# Patient Record
Sex: MALE | Race: OTHER | HISPANIC OR LATINO | URBAN - METROPOLITAN AREA
[De-identification: names, ages, dates, MRNs, and addresses within clinical notes are randomized per-mention and may not be internally consistent; named-entity substitution may affect disease eponyms.]

---

## 2019-11-19 ENCOUNTER — EMERGENCY (EMERGENCY)
Facility: HOSPITAL | Age: 34
LOS: 1 days | Discharge: HOME | End: 2019-11-19
Attending: EMERGENCY MEDICINE
Payer: COMMERCIAL

## 2019-11-19 VITALS
OXYGEN SATURATION: 99 % | RESPIRATION RATE: 18 BRPM | HEART RATE: 64 BPM | SYSTOLIC BLOOD PRESSURE: 112 MMHG | DIASTOLIC BLOOD PRESSURE: 67 MMHG | TEMPERATURE: 98 F

## 2019-11-19 VITALS
OXYGEN SATURATION: 99 % | SYSTOLIC BLOOD PRESSURE: 166 MMHG | WEIGHT: 160.06 LBS | RESPIRATION RATE: 18 BRPM | TEMPERATURE: 98 F | DIASTOLIC BLOOD PRESSURE: 89 MMHG | HEART RATE: 60 BPM | HEIGHT: 65 IN

## 2019-11-19 LAB
ALBUMIN SERPL ELPH-MCNC: 3.8 G/DL — SIGNIFICANT CHANGE UP (ref 3.5–5)
ALP SERPL-CCNC: 74 U/L — SIGNIFICANT CHANGE UP (ref 40–120)
ALT FLD-CCNC: 33 U/L DA — SIGNIFICANT CHANGE UP (ref 10–60)
ANION GAP SERPL CALC-SCNC: 8 MMOL/L — SIGNIFICANT CHANGE UP (ref 5–17)
APPEARANCE UR: CLEAR — SIGNIFICANT CHANGE UP
AST SERPL-CCNC: 23 U/L — SIGNIFICANT CHANGE UP (ref 10–40)
BASOPHILS # BLD AUTO: 0.05 K/UL — SIGNIFICANT CHANGE UP (ref 0–0.2)
BASOPHILS NFR BLD AUTO: 0.3 % — SIGNIFICANT CHANGE UP (ref 0–2)
BILIRUB SERPL-MCNC: 0.9 MG/DL — SIGNIFICANT CHANGE UP (ref 0.2–1.2)
BILIRUB UR-MCNC: NEGATIVE — SIGNIFICANT CHANGE UP
BUN SERPL-MCNC: 15 MG/DL — SIGNIFICANT CHANGE UP (ref 7–18)
CALCIUM SERPL-MCNC: 8.7 MG/DL — SIGNIFICANT CHANGE UP (ref 8.4–10.5)
CHLORIDE SERPL-SCNC: 106 MMOL/L — SIGNIFICANT CHANGE UP (ref 96–108)
CO2 SERPL-SCNC: 25 MMOL/L — SIGNIFICANT CHANGE UP (ref 22–31)
COLOR SPEC: YELLOW — SIGNIFICANT CHANGE UP
CREAT SERPL-MCNC: 1.27 MG/DL — SIGNIFICANT CHANGE UP (ref 0.5–1.3)
DIFF PNL FLD: NEGATIVE — SIGNIFICANT CHANGE UP
EOSINOPHIL # BLD AUTO: 0.05 K/UL — SIGNIFICANT CHANGE UP (ref 0–0.5)
EOSINOPHIL NFR BLD AUTO: 0.3 % — SIGNIFICANT CHANGE UP (ref 0–6)
GLUCOSE SERPL-MCNC: 104 MG/DL — HIGH (ref 70–99)
GLUCOSE UR QL: NEGATIVE — SIGNIFICANT CHANGE UP
HCT VFR BLD CALC: 41.3 % — SIGNIFICANT CHANGE UP (ref 39–50)
HGB BLD-MCNC: 14.4 G/DL — SIGNIFICANT CHANGE UP (ref 13–17)
IMM GRANULOCYTES NFR BLD AUTO: 0.4 % — SIGNIFICANT CHANGE UP (ref 0–1.5)
KETONES UR-MCNC: NEGATIVE — SIGNIFICANT CHANGE UP
LEUKOCYTE ESTERASE UR-ACNC: NEGATIVE — SIGNIFICANT CHANGE UP
LIDOCAIN IGE QN: 88 U/L — SIGNIFICANT CHANGE UP (ref 73–393)
LYMPHOCYTES # BLD AUTO: 16.3 % — SIGNIFICANT CHANGE UP (ref 13–44)
LYMPHOCYTES # BLD AUTO: 2.65 K/UL — SIGNIFICANT CHANGE UP (ref 1–3.3)
MCHC RBC-ENTMCNC: 32.3 PG — SIGNIFICANT CHANGE UP (ref 27–34)
MCHC RBC-ENTMCNC: 34.9 GM/DL — SIGNIFICANT CHANGE UP (ref 32–36)
MCV RBC AUTO: 92.6 FL — SIGNIFICANT CHANGE UP (ref 80–100)
MONOCYTES # BLD AUTO: 1.25 K/UL — HIGH (ref 0–0.9)
MONOCYTES NFR BLD AUTO: 7.7 % — SIGNIFICANT CHANGE UP (ref 2–14)
NEUTROPHILS # BLD AUTO: 12.24 K/UL — HIGH (ref 1.8–7.4)
NEUTROPHILS NFR BLD AUTO: 75 % — SIGNIFICANT CHANGE UP (ref 43–77)
NITRITE UR-MCNC: NEGATIVE — SIGNIFICANT CHANGE UP
NRBC # BLD: 0 /100 WBCS — SIGNIFICANT CHANGE UP (ref 0–0)
PH UR: 8 — SIGNIFICANT CHANGE UP (ref 5–8)
PLATELET # BLD AUTO: 311 K/UL — SIGNIFICANT CHANGE UP (ref 150–400)
POTASSIUM SERPL-MCNC: 3.8 MMOL/L — SIGNIFICANT CHANGE UP (ref 3.5–5.3)
POTASSIUM SERPL-SCNC: 3.8 MMOL/L — SIGNIFICANT CHANGE UP (ref 3.5–5.3)
PROT SERPL-MCNC: 7.2 G/DL — SIGNIFICANT CHANGE UP (ref 6–8.3)
PROT UR-MCNC: NEGATIVE — SIGNIFICANT CHANGE UP
RBC # BLD: 4.46 M/UL — SIGNIFICANT CHANGE UP (ref 4.2–5.8)
RBC # FLD: 11.8 % — SIGNIFICANT CHANGE UP (ref 10.3–14.5)
SODIUM SERPL-SCNC: 139 MMOL/L — SIGNIFICANT CHANGE UP (ref 135–145)
SP GR SPEC: 1.01 — SIGNIFICANT CHANGE UP (ref 1.01–1.02)
UROBILINOGEN FLD QL: NEGATIVE — SIGNIFICANT CHANGE UP
WBC # BLD: 16.3 K/UL — HIGH (ref 3.8–10.5)
WBC # FLD AUTO: 16.3 K/UL — HIGH (ref 3.8–10.5)

## 2019-11-19 PROCEDURE — 99284 EMERGENCY DEPT VISIT MOD MDM: CPT

## 2019-11-19 PROCEDURE — 96374 THER/PROPH/DIAG INJ IV PUSH: CPT

## 2019-11-19 PROCEDURE — 85027 COMPLETE CBC AUTOMATED: CPT

## 2019-11-19 PROCEDURE — 87086 URINE CULTURE/COLONY COUNT: CPT

## 2019-11-19 PROCEDURE — 80053 COMPREHEN METABOLIC PANEL: CPT

## 2019-11-19 PROCEDURE — 74176 CT ABD & PELVIS W/O CONTRAST: CPT

## 2019-11-19 PROCEDURE — 36415 COLL VENOUS BLD VENIPUNCTURE: CPT

## 2019-11-19 PROCEDURE — 96375 TX/PRO/DX INJ NEW DRUG ADDON: CPT

## 2019-11-19 PROCEDURE — 74176 CT ABD & PELVIS W/O CONTRAST: CPT | Mod: 26

## 2019-11-19 PROCEDURE — 99284 EMERGENCY DEPT VISIT MOD MDM: CPT | Mod: 25

## 2019-11-19 PROCEDURE — 83690 ASSAY OF LIPASE: CPT

## 2019-11-19 PROCEDURE — 81003 URINALYSIS AUTO W/O SCOPE: CPT

## 2019-11-19 RX ORDER — IBUPROFEN 200 MG
1 TABLET ORAL
Qty: 40 | Refills: 0
Start: 2019-11-19 | End: 2019-11-28

## 2019-11-19 RX ORDER — MORPHINE SULFATE 50 MG/1
4 CAPSULE, EXTENDED RELEASE ORAL ONCE
Refills: 0 | Status: DISCONTINUED | OUTPATIENT
Start: 2019-11-19 | End: 2019-11-19

## 2019-11-19 RX ORDER — OXYCODONE AND ACETAMINOPHEN 5; 325 MG/1; MG/1
1 TABLET ORAL ONCE
Refills: 0 | Status: DISCONTINUED | OUTPATIENT
Start: 2019-11-19 | End: 2019-11-19

## 2019-11-19 RX ORDER — SODIUM CHLORIDE 9 MG/ML
1000 INJECTION INTRAMUSCULAR; INTRAVENOUS; SUBCUTANEOUS ONCE
Refills: 0 | Status: COMPLETED | OUTPATIENT
Start: 2019-11-19 | End: 2019-11-19

## 2019-11-19 RX ORDER — KETOROLAC TROMETHAMINE 30 MG/ML
30 SYRINGE (ML) INJECTION ONCE
Refills: 0 | Status: DISCONTINUED | OUTPATIENT
Start: 2019-11-19 | End: 2019-11-19

## 2019-11-19 RX ORDER — TAMSULOSIN HYDROCHLORIDE 0.4 MG/1
0.4 CAPSULE ORAL ONCE
Refills: 0 | Status: COMPLETED | OUTPATIENT
Start: 2019-11-19 | End: 2019-11-19

## 2019-11-19 RX ORDER — TAMSULOSIN HYDROCHLORIDE 0.4 MG/1
1 CAPSULE ORAL
Qty: 10 | Refills: 0
Start: 2019-11-19 | End: 2019-11-28

## 2019-11-19 RX ADMIN — SODIUM CHLORIDE 1000 MILLILITER(S): 9 INJECTION INTRAMUSCULAR; INTRAVENOUS; SUBCUTANEOUS at 04:23

## 2019-11-19 RX ADMIN — Medication 30 MILLIGRAM(S): at 04:21

## 2019-11-19 RX ADMIN — TAMSULOSIN HYDROCHLORIDE 0.4 MILLIGRAM(S): 0.4 CAPSULE ORAL at 07:57

## 2019-11-19 RX ADMIN — SODIUM CHLORIDE 1000 MILLILITER(S): 9 INJECTION INTRAMUSCULAR; INTRAVENOUS; SUBCUTANEOUS at 04:21

## 2019-11-19 RX ADMIN — MORPHINE SULFATE 4 MILLIGRAM(S): 50 CAPSULE, EXTENDED RELEASE ORAL at 04:23

## 2019-11-19 RX ADMIN — MORPHINE SULFATE 4 MILLIGRAM(S): 50 CAPSULE, EXTENDED RELEASE ORAL at 04:21

## 2019-11-19 RX ADMIN — Medication 30 MILLIGRAM(S): at 04:23

## 2019-11-19 RX ADMIN — OXYCODONE AND ACETAMINOPHEN 1 TABLET(S): 5; 325 TABLET ORAL at 07:57

## 2019-11-19 NOTE — ED PROVIDER NOTE - NSFOLLOWUPINSTRUCTIONS_ED_ALL_ED_FT
take medications as prescribed.  Followup with urologist above for reevaluation    Return to ED if you develop fever>100.8F, worsening pain despite medication or vomiting.

## 2019-11-19 NOTE — ED PROVIDER NOTE - OBJECTIVE STATEMENT
Pt is a 34y M with significant PMHx of kidney stones and no significant PSHx presenting to the ED with 2 days worsening Rt flank pain associated with nausea. Pt reports dark urine yesterday that has since cleared up and mild dysuria. Pt has a history of Rt sided kidney stones and notes that his current pain radiates from the Rt flank down to groin. Pt denies any vomiting, no fever. Pt has NKDA and currently denies any additional complaints at this time.

## 2019-11-19 NOTE — ED PROVIDER NOTE - CLINICAL SUMMARY MEDICAL DECISION MAKING FREE TEXT BOX
Pt is a 34y M with pmhx of kidney stones presenting to the ED with Rt flank pain. Will obtain labs, UA and CT abdomen. Given Morphine and Toradol for pain along with IV fluids. Will reassess. Pt is a 34y M with pmhx of kidney stones presenting to the ED with Rt flank pain. Will obtain labs, UA and CT abdomen. Given Morphine and Toradol for pain along with IV fluids. Will reassess.    labs show wbc 16K, Cr wnl, UA neg  CT shows 5mm stone in proximal right ureter with mild hydronephrosis  Discussed above with patient. On reeval patient comfortable reporting only mild R flank pain at this time, given percocet/flomax. Patient stable for discharge to followup with urologist as outpatient. Pt is a 34y M with pmhx of kidney stones presenting to the ED with Rt flank pain. Will obtain labs, UA and CT abdomen. Given Morphine and Toradol for pain along with IV fluids. Will reassess.    labs show wbc 16K, Cr wnl, UA neg  CT shows Mild right-sided hydroureteronephrosis and asymmetric perinephric stranding secondary to a 5 mm calculus in the proximal right ureter.  Discussed above with patient. On reeval patient comfortable reporting only mild R flank pain at this time, given percocet/flomax. Patient stable for discharge to followup with urologist as outpatient. given careful return to ED precautions.

## 2019-11-19 NOTE — ED PROVIDER NOTE - PATIENT PORTAL LINK FT
You can access the FollowMyHealth Patient Portal offered by [facility] by registering at the following website: [email or web address]. By joining [employer]’s FollowMyHealth portal, you will also be able to view your health information using other applications (apps) compatible with our system.

## 2019-11-20 LAB
CULTURE RESULTS: SIGNIFICANT CHANGE UP
SPECIMEN SOURCE: SIGNIFICANT CHANGE UP

## 2019-11-21 ENCOUNTER — APPOINTMENT (OUTPATIENT)
Dept: UROLOGY | Facility: CLINIC | Age: 34
End: 2019-11-21
Payer: COMMERCIAL

## 2019-11-21 VITALS
HEART RATE: 73 BPM | SYSTOLIC BLOOD PRESSURE: 149 MMHG | BODY MASS INDEX: 26.66 KG/M2 | HEIGHT: 65 IN | WEIGHT: 160 LBS | DIASTOLIC BLOOD PRESSURE: 89 MMHG

## 2019-11-21 DIAGNOSIS — Z78.9 OTHER SPECIFIED HEALTH STATUS: ICD-10-CM

## 2019-11-21 DIAGNOSIS — Z84.1 FAMILY HISTORY OF DISORDERS OF KIDNEY AND URETER: ICD-10-CM

## 2019-11-21 PROBLEM — Z00.00 ENCOUNTER FOR PREVENTIVE HEALTH EXAMINATION: Status: ACTIVE | Noted: 2019-11-21

## 2019-11-21 PROCEDURE — 99204 OFFICE O/P NEW MOD 45 MIN: CPT

## 2019-11-21 RX ORDER — TAMSULOSIN HYDROCHLORIDE 0.4 MG/1
0.4 CAPSULE ORAL
Refills: 0 | Status: ACTIVE | COMMUNITY

## 2019-11-21 RX ORDER — OXYCODONE HYDROCHLORIDE AND ACETAMINOPHEN 5; 325 MG/1; MG/1
5-325 TABLET ORAL
Refills: 0 | Status: ACTIVE | COMMUNITY

## 2019-11-21 RX ORDER — IBUPROFEN 600 MG/1
600 TABLET, FILM COATED ORAL
Refills: 0 | Status: ACTIVE | COMMUNITY

## 2019-11-21 NOTE — ASSESSMENT
[FreeTextEntry1] : kub reviewed stone still in prox ureter\par reviewed options cont medical expulsive therapy, eswl and urs\par risks and benefits reviewed\par pt elect eswl \par risk of bleeding infection damage to kidney failure of procedure need for further procedure reviewed\par advised no aspirin ibuprofen blood thinning products for 1 week prior\par advised pt will be leaving next monday \par pt to be scheduled with dr chatman \par to er for worsening pain/fever

## 2019-11-21 NOTE — HISTORY OF PRESENT ILLNESS
[FreeTextEntry1] : cc kidneystone\par 35 yo male seen in  er monday with left flank pain \par ct showed 5 prox ureteral stone \par still has pain \par no fever \par voiding well\par has prior stone epsiode

## 2019-11-21 NOTE — REVIEW OF SYSTEMS
[Abdominal Pain] : abdominal pain [Vomiting] : vomiting [see HPI] : see HPI [History of kidney stones] : history of kidney stones [Negative] : Heme/Lymph

## 2019-11-25 ENCOUNTER — MEDICATION RENEWAL (OUTPATIENT)
Age: 34
End: 2019-11-25

## 2019-11-25 ENCOUNTER — FORM ENCOUNTER (OUTPATIENT)
Age: 34
End: 2019-11-25

## 2019-11-25 ENCOUNTER — OUTPATIENT (OUTPATIENT)
Dept: OUTPATIENT SERVICES | Facility: HOSPITAL | Age: 34
LOS: 1 days | End: 2019-11-25
Payer: COMMERCIAL

## 2019-11-25 VITALS
OXYGEN SATURATION: 98 % | TEMPERATURE: 98 F | HEIGHT: 65 IN | SYSTOLIC BLOOD PRESSURE: 150 MMHG | HEART RATE: 60 BPM | WEIGHT: 160.94 LBS | RESPIRATION RATE: 16 BRPM | DIASTOLIC BLOOD PRESSURE: 90 MMHG

## 2019-11-25 DIAGNOSIS — R10.9 UNSPECIFIED ABDOMINAL PAIN: ICD-10-CM

## 2019-11-25 DIAGNOSIS — N39.0 URINARY TRACT INFECTION, SITE NOT SPECIFIED: ICD-10-CM

## 2019-11-25 DIAGNOSIS — N20.1 CALCULUS OF URETER: ICD-10-CM

## 2019-11-25 DIAGNOSIS — Z01.818 ENCOUNTER FOR OTHER PREPROCEDURAL EXAMINATION: ICD-10-CM

## 2019-11-25 PROCEDURE — G0463: CPT

## 2019-11-25 RX ORDER — SODIUM CHLORIDE 9 MG/ML
3 INJECTION INTRAMUSCULAR; INTRAVENOUS; SUBCUTANEOUS EVERY 8 HOURS
Refills: 0 | Status: DISCONTINUED | OUTPATIENT
Start: 2019-11-26 | End: 2020-01-11

## 2019-11-25 RX ORDER — AMOXICILLIN AND CLAVULANATE POTASSIUM 875; 125 MG/1; MG/1
875-125 TABLET, COATED ORAL
Qty: 10 | Refills: 0 | Status: COMPLETED | COMMUNITY
Start: 2019-11-25 | End: 2019-11-30

## 2019-11-25 NOTE — H&P PST ADULT - NEGATIVE BREAST SYMPTOMS
no breast lump L/no nipple discharge L/no breast tenderness L/no breast lump R/no nipple discharge R/no breast tenderness R

## 2019-11-25 NOTE — H&P PST ADULT - NSICDXPASTMEDICALHX_GEN_ALL_CORE_FT
PAST MEDICAL HISTORY:  Calculus of right ureter     Kidney stone right side-2017    UTI (urinary tract infection), bacterial pt on Augmentin

## 2019-11-25 NOTE — H&P PST ADULT - NSANTHNECKRD_ENT_A_CORE
Patient was advised of message below. Verbally understands.    Stated that he hasn't taken the wellbutrin in an week. Requesting something else to be sent to pharmacy for him to stop smoking. Did mention about the patch his wife was using.   No

## 2019-11-25 NOTE — H&P PST ADULT - NSANTHOSAYNRD_GEN_A_CORE
No. [place] screening performed.  STOP BANG Legend: 0-2 = LOW Risk; 3-4 = INTERMEDIATE Risk; 5-8 = HIGH Risk

## 2019-11-25 NOTE — H&P PST ADULT - NEGATIVE ALLERGY TYPES
no reactions to insect bites/no outdoor environmental allergies/no indoor environmental allergies/no reactions to animals/no reactions to medicines/no reactions to food/shellfish allergy

## 2019-11-25 NOTE — H&P PST ADULT - NEGATIVE NEUROLOGICAL SYMPTOMS
no generalized seizures/no tremors/no loss of sensation/no hemiparesis/no facial palsy/no vertigo/no headache/no confusion/no transient paralysis/no paresthesias/no weakness/no focal seizures/no loss of consciousness/no syncope

## 2019-11-25 NOTE — H&P PST ADULT - NEGATIVE OPHTHALMOLOGIC SYMPTOMS
no pain L/no discharge R/no blurred vision L/no diplopia/no photophobia/no blurred vision R/no lacrimation L/no lacrimation R/no discharge L/no pain R

## 2019-11-25 NOTE — H&P PST ADULT - NSICDXPROBLEM_GEN_ALL_CORE_FT
PROBLEM DIAGNOSES  Problem: Acute right flank pain  Assessment and Plan: Pt is on morphine 15 mg orally , Ibuprofen 600 mg orally and Tylenol 925 mg orally, pt encouraged to continue Tylenol and morpine today, no morphuine in am of sx, no longer Ibuprofen for pain, since BP increased due to acute pain - 8-9/10 , pt is not comfortabble, walking during visit at PST     Problem: Urinary tract infection  Assessment and Plan: patient on Augmentin,started today, called Dr. Vieyra, notified her taht , U/A , utine cultures not sent, , pt just started abx    Problem: Calculus of right ureter  Assessment and Plan: Patient  is scheduled for right extracorporeal shockwave lithotripsy on 11/26/19.

## 2019-11-25 NOTE — H&P PST ADULT - HISTORY OF PRESENT ILLNESS
34 years old male presented to [facility] for evaluation before surgery, pt was diagnosed with calculus of ureter and is scheduled for right extracorporeal shockwave lithotripsy on 11/26/19.

## 2019-11-25 NOTE — H&P PST ADULT - CONSTITUTIONAL COMMENTS
pt is walking, can not find comfortable position, has elevated BP due to pain , seen second tome on ER for pain

## 2019-11-25 NOTE — H&P PST ADULT - NEGATIVE GENERAL GENITOURINARY SYMPTOMS
no dysuria/no incontinence/hx of right kidney stones, currently UTI, currently on abx , presently calculus of right kidney ,/no flank pain L/no urine discoloration/no hematuria

## 2019-11-25 NOTE — H&P PST ADULT - ATTENDING COMMENTS
Decision made to change procedure to right ureteroscopy with holmium laser lithotripsy with ureteral stent. REviewed all risks and benefits and informed consent obtained

## 2019-11-26 ENCOUNTER — APPOINTMENT (OUTPATIENT)
Dept: UROLOGY | Facility: HOSPITAL | Age: 34
End: 2019-11-26

## 2019-11-26 ENCOUNTER — OUTPATIENT (OUTPATIENT)
Dept: OUTPATIENT SERVICES | Facility: HOSPITAL | Age: 34
LOS: 1 days | End: 2019-11-26
Payer: COMMERCIAL

## 2019-11-26 VITALS
HEART RATE: 65 BPM | OXYGEN SATURATION: 98 % | TEMPERATURE: 99 F | SYSTOLIC BLOOD PRESSURE: 133 MMHG | RESPIRATION RATE: 18 BRPM | DIASTOLIC BLOOD PRESSURE: 68 MMHG

## 2019-11-26 VITALS
WEIGHT: 160.94 LBS | RESPIRATION RATE: 18 BRPM | HEART RATE: 61 BPM | HEIGHT: 65 IN | SYSTOLIC BLOOD PRESSURE: 155 MMHG | OXYGEN SATURATION: 99 % | DIASTOLIC BLOOD PRESSURE: 82 MMHG | TEMPERATURE: 98 F

## 2019-11-26 DIAGNOSIS — Z01.818 ENCOUNTER FOR OTHER PREPROCEDURAL EXAMINATION: ICD-10-CM

## 2019-11-26 DIAGNOSIS — N20.1 CALCULUS OF URETER: ICD-10-CM

## 2019-11-26 PROCEDURE — C1726: CPT

## 2019-11-26 PROCEDURE — 76000 FLUOROSCOPY <1 HR PHYS/QHP: CPT

## 2019-11-26 PROCEDURE — 52344 CYSTO/URETERO STRICTURE TX: CPT | Mod: RT

## 2019-11-26 PROCEDURE — C1758: CPT

## 2019-11-26 PROCEDURE — 52332 CYSTOSCOPY AND TREATMENT: CPT | Mod: RT

## 2019-11-26 PROCEDURE — C2617: CPT

## 2019-11-26 PROCEDURE — C1769: CPT

## 2019-11-26 PROCEDURE — C1889: CPT

## 2019-11-26 RX ORDER — OXYCODONE AND ACETAMINOPHEN 5; 325 MG/1; MG/1
1 TABLET ORAL EVERY 6 HOURS
Refills: 0 | Status: DISCONTINUED | OUTPATIENT
Start: 2019-11-26 | End: 2019-11-26

## 2019-11-26 RX ORDER — HYDROMORPHONE HYDROCHLORIDE 2 MG/ML
0.5 INJECTION INTRAMUSCULAR; INTRAVENOUS; SUBCUTANEOUS
Refills: 0 | Status: DISCONTINUED | OUTPATIENT
Start: 2019-11-26 | End: 2019-11-26

## 2019-11-26 RX ORDER — ONDANSETRON 8 MG/1
1 TABLET, FILM COATED ORAL
Qty: 0 | Refills: 0 | DISCHARGE

## 2019-11-26 RX ORDER — OXYCODONE AND ACETAMINOPHEN 5; 325 MG/1; MG/1
2 TABLET ORAL EVERY 6 HOURS
Refills: 0 | Status: DISCONTINUED | OUTPATIENT
Start: 2019-11-26 | End: 2019-11-26

## 2019-11-26 RX ORDER — KETOROLAC TROMETHAMINE 30 MG/ML
15 SYRINGE (ML) INJECTION EVERY 6 HOURS
Refills: 0 | Status: COMPLETED | OUTPATIENT
Start: 2019-11-26 | End: 2019-12-01

## 2019-11-26 RX ORDER — OXYBUTYNIN CHLORIDE 5 MG
1 TABLET ORAL
Qty: 4 | Refills: 0
Start: 2019-11-26 | End: 2019-11-29

## 2019-11-26 RX ORDER — ONDANSETRON 8 MG/1
4 TABLET, FILM COATED ORAL ONCE
Refills: 0 | Status: DISCONTINUED | OUTPATIENT
Start: 2019-11-26 | End: 2019-11-26

## 2019-11-26 RX ORDER — PHENAZOPYRIDINE HCL 100 MG
100 TABLET ORAL ONCE
Refills: 0 | Status: DISCONTINUED | OUTPATIENT
Start: 2019-11-26 | End: 2020-01-11

## 2019-11-26 RX ORDER — AZTREONAM 2 G
1 VIAL (EA) INJECTION
Qty: 6 | Refills: 0
Start: 2019-11-26 | End: 2019-11-28

## 2019-11-26 RX ORDER — PHENAZOPYRIDINE HCL 100 MG
1 TABLET ORAL
Qty: 12 | Refills: 0
Start: 2019-11-26 | End: 2019-11-29

## 2019-11-26 RX ORDER — OXYBUTYNIN CHLORIDE 5 MG
5 TABLET ORAL ONCE
Refills: 0 | Status: DISCONTINUED | OUTPATIENT
Start: 2019-11-26 | End: 2020-01-11

## 2019-11-26 RX ORDER — ACETAMINOPHEN 500 MG
3 TABLET ORAL
Qty: 0 | Refills: 0 | DISCHARGE

## 2019-11-26 RX ORDER — MORPHINE SULFATE 50 MG/1
1 CAPSULE, EXTENDED RELEASE ORAL
Qty: 0 | Refills: 0 | DISCHARGE

## 2019-11-26 RX ORDER — SODIUM CHLORIDE 9 MG/ML
1000 INJECTION, SOLUTION INTRAVENOUS
Refills: 0 | Status: DISCONTINUED | OUTPATIENT
Start: 2019-11-26 | End: 2019-11-26

## 2019-11-26 RX ADMIN — Medication 15 MILLIGRAM(S): at 14:36

## 2019-11-26 RX ADMIN — HYDROMORPHONE HYDROCHLORIDE 0.5 MILLIGRAM(S): 2 INJECTION INTRAMUSCULAR; INTRAVENOUS; SUBCUTANEOUS at 12:19

## 2019-11-26 RX ADMIN — HYDROMORPHONE HYDROCHLORIDE 0.5 MILLIGRAM(S): 2 INJECTION INTRAMUSCULAR; INTRAVENOUS; SUBCUTANEOUS at 12:04

## 2019-11-26 RX ADMIN — HYDROMORPHONE HYDROCHLORIDE 0.5 MILLIGRAM(S): 2 INJECTION INTRAMUSCULAR; INTRAVENOUS; SUBCUTANEOUS at 12:34

## 2019-11-26 RX ADMIN — HYDROMORPHONE HYDROCHLORIDE 0.5 MILLIGRAM(S): 2 INJECTION INTRAMUSCULAR; INTRAVENOUS; SUBCUTANEOUS at 13:30

## 2019-11-26 RX ADMIN — HYDROMORPHONE HYDROCHLORIDE 0.5 MILLIGRAM(S): 2 INJECTION INTRAMUSCULAR; INTRAVENOUS; SUBCUTANEOUS at 11:49

## 2019-11-26 RX ADMIN — HYDROMORPHONE HYDROCHLORIDE 0.5 MILLIGRAM(S): 2 INJECTION INTRAMUSCULAR; INTRAVENOUS; SUBCUTANEOUS at 13:00

## 2019-11-26 NOTE — ASU DISCHARGE PLAN (ADULT/PEDIATRIC) - PROCEDURE
Cystoscopy, right ureteroscopy, retrograde pyelogram, right ureteral balloon dilation, right stent placement

## 2019-11-26 NOTE — ASU DISCHARGE PLAN (ADULT/PEDIATRIC) - ASU DC SPECIAL INSTRUCTIONSFT
Please call to make a follow up apt in 1 week with Dr. Vieyra in the office.  Take flomax prescribed to you to help you urinate.  Take oral antibiotic (Bactrim) for a total of 3 days.  May take over the counter tylenol or motrin for mild pain relief.  May take prescribed percocet for moderate pain relief.

## 2019-11-26 NOTE — BRIEF OPERATIVE NOTE - NSICDXBRIEFPOSTOP_GEN_ALL_CORE_FT
POST-OP DIAGNOSIS:  Right ureteral calculus 26-Nov-2019 10:43:37  Denise Mendez POST-OP DIAGNOSIS:  Ureteral stricture, right 26-Nov-2019 14:54:41  Catherine Vieyra  Right ureteral calculus 26-Nov-2019 10:43:37  Denise Mendez

## 2019-11-26 NOTE — ASU DISCHARGE PLAN (ADULT/PEDIATRIC) - CARE PROVIDER_API CALL
Catherine Vieyra (MD; MPH)  Urology  [address] [place] [address]  [place] [address]  Phone: [phone]  Fax: [phone]  Follow Up Time:

## 2019-11-26 NOTE — ASU DISCHARGE PLAN (ADULT/PEDIATRIC) - CALL YOUR DOCTOR IF YOU HAVE ANY OF THE FOLLOWING:
Unable to urinate/Fever greater than (need to indicate Fahrenheit or Celsius)/Pain not relieved by Medications/Bleeding that does not stop

## 2019-11-26 NOTE — BRIEF OPERATIVE NOTE - NSICDXBRIEFPROCEDURE_GEN_ALL_CORE_FT
PROCEDURES:  Cystourethroscopy with ureteroscopy with ureteral stricture dilation, balloon 26-Nov-2019 10:43:00  Denise Mendez  Cystoscopy with right retrograde pyelography with ureteroscopy with insertion of ureteral stent 26-Nov-2019 10:42:51  Denise Mendez

## 2019-11-26 NOTE — BRIEF OPERATIVE NOTE - OPERATION/FINDINGS
Wound class 2  See operative report for details  Unable to gain access to proximal ureter, no lithotripsy performed

## 2019-12-02 ENCOUNTER — APPOINTMENT (OUTPATIENT)
Age: 34
End: 2019-12-02
Payer: COMMERCIAL

## 2019-12-02 VITALS
DIASTOLIC BLOOD PRESSURE: 80 MMHG | HEIGHT: 65 IN | WEIGHT: 160 LBS | SYSTOLIC BLOOD PRESSURE: 128 MMHG | BODY MASS INDEX: 26.66 KG/M2 | HEART RATE: 75 BPM

## 2019-12-02 PROBLEM — N20.1 CALCULUS OF URETER: Chronic | Status: ACTIVE | Noted: 2019-11-25

## 2019-12-02 PROBLEM — N39.0 URINARY TRACT INFECTION, SITE NOT SPECIFIED: Chronic | Status: ACTIVE | Noted: 2019-11-25

## 2019-12-02 PROBLEM — N20.0 CALCULUS OF KIDNEY: Chronic | Status: ACTIVE | Noted: 2019-11-19

## 2019-12-02 PROCEDURE — 99214 OFFICE O/P EST MOD 30 MIN: CPT

## 2019-12-02 NOTE — HISTORY OF PRESENT ILLNESS
[FreeTextEntry1] : 33 yo M s/p right ureteroscopy with stent placement\par Pt was originally scheduled for an ESWL but at the last minute decided to proceed with URS.HLL\par During procedure, ureter was inflamed and ureteroscopy was unable to be advanced up to the stone\par Ureteral stent was placed with plans to return to the OR within the next few weeks\par Since stent placement, pt states he is feeling much better. Off of all pain meds\par Does have some discomfort at the end of urination but nothing compared to before the stent\par Light pink urine sometimes

## 2019-12-02 NOTE — PHYSICAL EXAM
[General Appearance - Well Developed] : well developed [General Appearance - Well Nourished] : well nourished [Normal Appearance] : normal appearance [Well Groomed] : well groomed [General Appearance - In No Acute Distress] : no acute distress [Abdomen Soft] : soft [Abdomen Tenderness] : non-tender [Costovertebral Angle Tenderness] : no ~M costovertebral angle tenderness [Urinary Bladder Findings] : the bladder was normal on palpation [Edema] : no peripheral edema [Respiration, Rhythm And Depth] : normal respiratory rhythm and effort [] : no respiratory distress [Exaggerated Use Of Accessory Muscles For Inspiration] : no accessory muscle use [Oriented To Time, Place, And Person] : oriented to person, place, and time [Affect] : the affect was normal [Mood] : the mood was normal [Not Anxious] : not anxious [Normal Station and Gait] : the gait and station were normal for the patient's age [No Focal Deficits] : no focal deficits [No Palpable Adenopathy] : no palpable adenopathy

## 2019-12-02 NOTE — ASSESSMENT
[FreeTextEntry1] : 33 yo M s/p right ureteral stent placement\par \par - Schedule right URS/HLL in the next few weeks\par - Reaffirmed behavioral modifications

## 2019-12-20 ENCOUNTER — OUTPATIENT (OUTPATIENT)
Dept: OUTPATIENT SERVICES | Facility: HOSPITAL | Age: 34
LOS: 1 days | End: 2019-12-20
Payer: COMMERCIAL

## 2019-12-20 VITALS
HEIGHT: 65 IN | DIASTOLIC BLOOD PRESSURE: 77 MMHG | TEMPERATURE: 98 F | WEIGHT: 156.97 LBS | SYSTOLIC BLOOD PRESSURE: 135 MMHG | RESPIRATION RATE: 16 BRPM | HEART RATE: 63 BPM | OXYGEN SATURATION: 100 %

## 2019-12-20 DIAGNOSIS — Z01.818 ENCOUNTER FOR OTHER PREPROCEDURAL EXAMINATION: ICD-10-CM

## 2019-12-20 DIAGNOSIS — N20.2 CALCULUS OF KIDNEY WITH CALCULUS OF URETER: ICD-10-CM

## 2019-12-20 LAB
ANION GAP SERPL CALC-SCNC: 4 MMOL/L — LOW (ref 5–17)
APPEARANCE UR: CLEAR — SIGNIFICANT CHANGE UP
BILIRUB UR-MCNC: NEGATIVE — SIGNIFICANT CHANGE UP
BUN SERPL-MCNC: 11 MG/DL — SIGNIFICANT CHANGE UP (ref 7–18)
CALCIUM SERPL-MCNC: 9 MG/DL — SIGNIFICANT CHANGE UP (ref 8.4–10.5)
CHLORIDE SERPL-SCNC: 106 MMOL/L — SIGNIFICANT CHANGE UP (ref 96–108)
CO2 SERPL-SCNC: 30 MMOL/L — SIGNIFICANT CHANGE UP (ref 22–31)
COLOR SPEC: YELLOW — SIGNIFICANT CHANGE UP
COMMENT - URINE: SIGNIFICANT CHANGE UP
CREAT SERPL-MCNC: 0.96 MG/DL — SIGNIFICANT CHANGE UP (ref 0.5–1.3)
DIFF PNL FLD: ABNORMAL
EPI CELLS # UR: ABNORMAL /HPF
GLUCOSE SERPL-MCNC: 102 MG/DL — HIGH (ref 70–99)
GLUCOSE UR QL: NEGATIVE — SIGNIFICANT CHANGE UP
HCT VFR BLD CALC: 42.4 % — SIGNIFICANT CHANGE UP (ref 39–50)
HGB BLD-MCNC: 14 G/DL — SIGNIFICANT CHANGE UP (ref 13–17)
HYALINE CASTS # UR AUTO: ABNORMAL /LPF
KETONES UR-MCNC: NEGATIVE — SIGNIFICANT CHANGE UP
LEUKOCYTE ESTERASE UR-ACNC: ABNORMAL
MCHC RBC-ENTMCNC: 31.2 PG — SIGNIFICANT CHANGE UP (ref 27–34)
MCHC RBC-ENTMCNC: 33 GM/DL — SIGNIFICANT CHANGE UP (ref 32–36)
MCV RBC AUTO: 94.4 FL — SIGNIFICANT CHANGE UP (ref 80–100)
NITRITE UR-MCNC: NEGATIVE — SIGNIFICANT CHANGE UP
NRBC # BLD: 0 /100 WBCS — SIGNIFICANT CHANGE UP (ref 0–0)
PH UR: 6 — SIGNIFICANT CHANGE UP (ref 5–8)
PLATELET # BLD AUTO: 361 K/UL — SIGNIFICANT CHANGE UP (ref 150–400)
POTASSIUM SERPL-MCNC: 4.1 MMOL/L — SIGNIFICANT CHANGE UP (ref 3.5–5.3)
POTASSIUM SERPL-SCNC: 4.1 MMOL/L — SIGNIFICANT CHANGE UP (ref 3.5–5.3)
PROT UR-MCNC: 30 MG/DL
RBC # BLD: 4.49 M/UL — SIGNIFICANT CHANGE UP (ref 4.2–5.8)
RBC # FLD: 12 % — SIGNIFICANT CHANGE UP (ref 10.3–14.5)
RBC CASTS # UR COMP ASSIST: ABNORMAL /HPF (ref 0–2)
SODIUM SERPL-SCNC: 140 MMOL/L — SIGNIFICANT CHANGE UP (ref 135–145)
SP GR SPEC: 1.01 — SIGNIFICANT CHANGE UP (ref 1.01–1.02)
UROBILINOGEN FLD QL: NEGATIVE — SIGNIFICANT CHANGE UP
WBC # BLD: 6.33 K/UL — SIGNIFICANT CHANGE UP (ref 3.8–10.5)
WBC # FLD AUTO: 6.33 K/UL — SIGNIFICANT CHANGE UP (ref 3.8–10.5)
WBC UR QL: ABNORMAL /HPF (ref 0–5)

## 2019-12-20 PROCEDURE — G0463: CPT

## 2019-12-20 NOTE — H&P PST ADULT - PRO PAIN EXPRESSION
FONT COLOR=\"#[id number]\">NIDA BOND  : 1963 10:27:12  ACCOUNT:  [id number]  HOME PHONE:  [phone]  WORK PHONE:  [phone]    Consulted with Dr. Basilio regarding doing measurement of thoracic aorta at the same time as CTA coronary arteries. Praful ok with obtaining measurement at the time of test. Patient needs to complete BMP prior to CTA for renal function. HR at last OV in 60s. No metoprolol needed for HR. Orders for CTA and BMP faxed to central scheduling. Left message for patient to return call for instructions. VS     verbalization

## 2019-12-20 NOTE — H&P PST ADULT - NEGATIVE ALLERGY TYPES
no outdoor environmental allergies/no indoor environmental allergies/no reactions to animals/no reactions to medicines/no reactions to insect bites

## 2019-12-20 NOTE — H&P PST ADULT - REASON FOR ADMISSION
I had the same surgery last month, they were able to put a stent in my bladder. Now I am going to remove the stone

## 2019-12-20 NOTE — H&P PST ADULT - HISTORY OF PRESENT ILLNESS
35 yo male reports the above. Patient states that for past 3 days the pain that used to be in the right lower back is in the right epigastric. Patient denies hematuria. He is scheduled for Cystoscopy  Right Retrograde Pyelogram  Right Ureteroscopy with Holmium Laser Lithotripsy on 12/24/19

## 2019-12-20 NOTE — H&P PST ADULT - ASSESSMENT
35 yo male is scheduled for :  Cystoscopy   Right Retrograde Pyelogram  Right Ureteroscopy with Holmium Laser Lithotripsy, on 12/24/19

## 2019-12-21 LAB
CULTURE RESULTS: SIGNIFICANT CHANGE UP
SPECIMEN SOURCE: SIGNIFICANT CHANGE UP

## 2019-12-23 ENCOUNTER — FORM ENCOUNTER (OUTPATIENT)
Age: 34
End: 2019-12-23

## 2019-12-24 ENCOUNTER — APPOINTMENT (OUTPATIENT)
Dept: UROLOGY | Facility: HOSPITAL | Age: 34
End: 2019-12-24

## 2019-12-24 ENCOUNTER — OUTPATIENT (OUTPATIENT)
Dept: OUTPATIENT SERVICES | Facility: HOSPITAL | Age: 34
LOS: 1 days | End: 2019-12-24
Payer: COMMERCIAL

## 2019-12-24 ENCOUNTER — RESULT REVIEW (OUTPATIENT)
Age: 34
End: 2019-12-24

## 2019-12-24 VITALS
HEART RATE: 72 BPM | OXYGEN SATURATION: 99 % | TEMPERATURE: 98 F | RESPIRATION RATE: 16 BRPM | DIASTOLIC BLOOD PRESSURE: 91 MMHG | SYSTOLIC BLOOD PRESSURE: 144 MMHG

## 2019-12-24 VITALS
SYSTOLIC BLOOD PRESSURE: 128 MMHG | OXYGEN SATURATION: 100 % | DIASTOLIC BLOOD PRESSURE: 74 MMHG | TEMPERATURE: 98 F | HEART RATE: 69 BPM | HEIGHT: 65 IN | WEIGHT: 156.97 LBS | RESPIRATION RATE: 18 BRPM

## 2019-12-24 DIAGNOSIS — N20.2 CALCULUS OF KIDNEY WITH CALCULUS OF URETER: ICD-10-CM

## 2019-12-24 DIAGNOSIS — Z01.818 ENCOUNTER FOR OTHER PREPROCEDURAL EXAMINATION: ICD-10-CM

## 2019-12-24 PROCEDURE — 88300 SURGICAL PATH GROSS: CPT | Mod: 26

## 2019-12-24 PROCEDURE — 88300 SURGICAL PATH GROSS: CPT

## 2019-12-24 PROCEDURE — 52356 CYSTO/URETERO W/LITHOTRIPSY: CPT | Mod: RT

## 2019-12-24 PROCEDURE — C1769: CPT

## 2019-12-24 PROCEDURE — C2617: CPT

## 2019-12-24 PROCEDURE — V2632: CPT

## 2019-12-24 PROCEDURE — 82365 CALCULUS SPECTROSCOPY: CPT

## 2019-12-24 PROCEDURE — 74420 UROGRAPHY RTRGR +-KUB: CPT | Mod: 26

## 2019-12-24 PROCEDURE — C1889: CPT

## 2019-12-24 PROCEDURE — 76000 FLUOROSCOPY <1 HR PHYS/QHP: CPT

## 2019-12-24 RX ORDER — ONDANSETRON 8 MG/1
4 TABLET, FILM COATED ORAL ONCE
Refills: 0 | Status: DISCONTINUED | OUTPATIENT
Start: 2019-12-24 | End: 2019-12-24

## 2019-12-24 RX ORDER — SODIUM CHLORIDE 9 MG/ML
3 INJECTION INTRAMUSCULAR; INTRAVENOUS; SUBCUTANEOUS EVERY 8 HOURS
Refills: 0 | Status: DISCONTINUED | OUTPATIENT
Start: 2019-12-24 | End: 2019-12-24

## 2019-12-24 RX ORDER — SODIUM CHLORIDE 9 MG/ML
1000 INJECTION, SOLUTION INTRAVENOUS
Refills: 0 | Status: DISCONTINUED | OUTPATIENT
Start: 2019-12-24 | End: 2019-12-24

## 2019-12-24 RX ORDER — MORPHINE SULFATE 50 MG/1
4 CAPSULE, EXTENDED RELEASE ORAL
Refills: 0 | Status: DISCONTINUED | OUTPATIENT
Start: 2019-12-24 | End: 2019-12-24

## 2019-12-24 RX ORDER — AZTREONAM 2 G
1 VIAL (EA) INJECTION
Qty: 10 | Refills: 0
Start: 2019-12-24 | End: 2019-12-28

## 2019-12-24 RX ORDER — ACETAMINOPHEN 500 MG
1000 TABLET ORAL ONCE
Refills: 0 | Status: DISCONTINUED | OUTPATIENT
Start: 2019-12-24 | End: 2019-12-24

## 2019-12-24 RX ORDER — FENTANYL CITRATE 50 UG/ML
25 INJECTION INTRAVENOUS
Refills: 0 | Status: DISCONTINUED | OUTPATIENT
Start: 2019-12-24 | End: 2019-12-24

## 2019-12-24 RX ORDER — OXYCODONE AND ACETAMINOPHEN 5; 325 MG/1; MG/1
1 TABLET ORAL EVERY 6 HOURS
Refills: 0 | Status: DISCONTINUED | OUTPATIENT
Start: 2019-12-24 | End: 2019-12-24

## 2019-12-24 RX ORDER — ONDANSETRON 8 MG/1
4 TABLET, FILM COATED ORAL EVERY 6 HOURS
Refills: 0 | Status: DISCONTINUED | OUTPATIENT
Start: 2019-12-24 | End: 2020-01-11

## 2019-12-24 RX ADMIN — FENTANYL CITRATE 25 MICROGRAM(S): 50 INJECTION INTRAVENOUS at 10:54

## 2019-12-24 RX ADMIN — FENTANYL CITRATE 25 MICROGRAM(S): 50 INJECTION INTRAVENOUS at 10:44

## 2019-12-24 NOTE — ASU DISCHARGE PLAN (ADULT/PEDIATRIC) - PROCEDURE
cystoscopy, right ureteroscopy, right stent removal, cystoscopy, right ureteroscopy, right stent removal, laser lithotripsy, stone extraction, right stent placement cystoscopy, right ureteroscopy, right stent removal, laser lithotripsy, stone removal, right stent placement

## 2019-12-24 NOTE — ASU DISCHARGE PLAN (ADULT/PEDIATRIC) - CARE PROVIDER_API CALL
Catherine Vieyra; MPH)  Urology  [address] [facility] [place] [address]  [place] [address]  Phone: [phone]  Fax: [phone]  Established Patient  Follow Up Time: 2 weeks

## 2019-12-24 NOTE — BRIEF OPERATIVE NOTE - NSICDXBRIEFPROCEDURE_GEN_ALL_CORE_FT
PROCEDURES:  Cystoscopic extraction of ureteral stone 24-Dec-2019 10:16:30  Zully Chen  Cystoscopic removal of ureteral stent 24-Dec-2019 10:16:21  Zully Chen  Lithotripsy, with ureteral stent placement 24-Dec-2019 10:16:11  Zully Chen  Right ureteroscopy 24-Dec-2019 10:15:37  Zully Chen  Retrograde pyelogram 24-Dec-2019 10:15:29  Zully Chen  Cystoscopy 24-Dec-2019 10:15:21  Zully Chen

## 2019-12-24 NOTE — ASU PATIENT PROFILE, ADULT - PMH
Calculus of right ureter    Kidney stone  right side-2017  UTI (urinary tract infection), bacterial  pt on Augmentin

## 2019-12-30 ENCOUNTER — APPOINTMENT (OUTPATIENT)
Age: 34
End: 2019-12-30
Payer: COMMERCIAL

## 2019-12-30 VITALS
HEART RATE: 79 BPM | RESPIRATION RATE: 16 BRPM | WEIGHT: 160 LBS | SYSTOLIC BLOOD PRESSURE: 122 MMHG | DIASTOLIC BLOOD PRESSURE: 70 MMHG | BODY MASS INDEX: 26.66 KG/M2 | HEIGHT: 65 IN

## 2019-12-30 DIAGNOSIS — N20.1 CALCULUS OF URETER: ICD-10-CM

## 2019-12-30 PROCEDURE — 52310 CYSTOSCOPY AND TREATMENT: CPT

## 2020-01-04 PROBLEM — N20.1 CALCULUS OF RIGHT URETER: Status: ACTIVE | Noted: 2019-11-21

## 2021-05-06 NOTE — H&P PST ADULT - ACTIVITY
Chart reviewed.  walking, ADLs Chart reviewed. Patient AOx2. Upon eval, patient sitting up in bed teary-eyed with red/puffy eyes, multiple ecchymoses noted in various stages of healing. States that he has been rubbing his eyes a lot. Patient very withdrawn and not making eye contact on interview with flat, scared affect. Disorganized thought process with poverty of speech/thought. Patient states that he does not know why is in the hospital, and adds that he does not want to be here. When asked about life at home, replies that it is alright. States "I don't want to talk about it" when asked about his relationship with his father. Admits to being a private person. States that he slept okay last night. Denies feeling of depression, anxiety, AH/VH, SI/HI.     Collateral obtained from patient's father this afternoon: Father states that the patient's psych history began in 2002 when he was hospitalized for depression shortly after losing his job at [employer]. He said that the patient would get very angry at little things at this time. Stated that patient thought he was wrestler [employer] and that he would make odd noises and roll around on the floor. Prior to this time, the patient did not have any formal diagnoses but as per father, "he was a frail and quiet kid who did not have any friends growing up and was bullied often by his peers."   The patient was discharged from his initial hospitalization in 2002 with sertraline 100mg, Risperidone 4mg, and Haldol. Father states that the family decided to stop giving him the Haldol right away because the patient was drooling and spacey since starting the medication. They also began cutting the Risperidone in half and were only giving him 2mg every day, starting in 2002. The patient's prescribing doctor Dr. Sanabria was not made aware of theses changes until about 1 month ago. Upon discharge in 2002, patient also began following up with psychiatry and was referred to a day program at [place] which he attended every day until the beginning of the pandemic in March 2020.   About 1 month ago, father states that the patient began to appear more withdrawn. He was no longer getting himself dressed in the morning, eating, or going to the store/walks like he usually did. Father endorses that he would grab his son by his arm and pull him forward to try to get him to help out around the house or get himself dressed in the morning. Instead, he would sit on the couch all day and not do anything. Before this change, the patient was not very talkative at baseline but he would go to the store every morning to get his father a bagel and the paper and would help his mother around the house. He said that he still did not have much of a social life, and would often sit in the corner not engaged by anyone. They contacted the patient's psychiatrist when they noticed this change and told him that he was only talking 2mg of the Risperidone. They were advised to have him take the prescribed dose of 4mg, which he did for a short period, but when the family noticed no change they brought it back down to 2mg. Father added that he would tell the patient to wake him up at 10pm to make sure he took his medications, but the patient did not always do this. He was unsure if the patient was missing meds/how often.   The father denied any other hospitalizations other than the initial encounter in 2002 and the current admission. Denied any knowledge of a formal psychiatric diagnosis for his son. Denies he sone ever telling him about AH/VH, SI/HI. Denies any substance use or identified stressors other than the change in routine brought about by the start of the pandemic.   Father endorses that he himself was hospitalized at 19y/o for an "emotional breakdown" and was diagnosed with schizophrenia. He was in [facility] for 4 months, and after that followed up for about 1 year with psychiatry. He decided on his own to stop his meds cold turkey and "fought my way out of it."      Chart reviewed. Patient AOx2. Upon eval, patient sitting up in bed teary-eyed with red/puffy eyes, multiple ecchymoses noted in various stages of healing. States that he has been rubbing his eyes a lot. Patient very withdrawn and not making eye contact on interview with flat, scared affect. Disorganized thought process with poverty of speech/thought. Patient states that he does not know why is in the hospital, and adds that he does not want to be here. When asked about life at home, replies that it is alright. States "I don't want to talk about it" when asked about his relationship with his father. Admits to being a private person. States that he slept okay last night. Denies feeling of depression, anxiety, AH/VH, SI/HI.     Collateral obtained from patient's father this afternoon: Father states that the patient's psych history began in 2002 when he was hospitalized for depression shortly after losing his job at [employer]. He said that the patient would get very angry at little things at this time. Stated that patient thought he was wrestler [employer] and that he would make odd noises and roll around on the floor. Prior to this time, the patient did not have any formal diagnoses but as per father, "he was a frail and quiet kid who did not have any friends growing up and was bullied often by his peers."   The patient was discharged from his initial hospitalization in 2002 with sertraline 100mg, Risperidone 4mg, and Haldol. Father states that the family decided to stop giving him the Haldol right away because the patient was drooling and spacey since starting the medication. They also began cutting the Risperidone in half and were only giving him 2mg every day, starting in 2002. The patient's prescribing doctor Dr. Sanabria was not made aware of theses changes until about 1 month ago. Upon discharge in 2002, patient also began following up with psychiatry and was referred to a day program at [facility] which he attended every day until the beginning of the pandemic in March 2020.   About 1 month ago, father states that the patient began to appear more withdrawn. He was no longer getting himself dressed in the morning, eating, or going to the store/walks like he usually did. Father endorses that he would grab his son by his arm and pull him forward to try to get him to help out around the house or get himself dressed in the morning. Instead, he would sit on the couch all day and not do anything. Before this change, the patient was not very talkative at baseline but he would go to the store every morning to get his father a bagel and the paper and would help his mother around the house. He said that he still did not have much of a social life, and would often sit in the corner not engaged by anyone. They contacted the patient's psychiatrist when they noticed this change and told him that he was only talking 2mg of the Risperidone. They were advised to have him take the prescribed dose of 4mg, which he did for a short period, but when the family noticed no change they brought it back down to 2mg. Father added that he would tell the patient to wake him up at 10pm to make sure he took his medications, but the patient did not always do this. He was unsure if the patient was missing meds/how often.   The father denied any other hospitalizations other than the initial encounter in 2002 and the current admission. Denied any knowledge of a formal psychiatric diagnosis for his son. Denies he son ever telling him about AH/VH, SI/HI. Denies any substance use or identified stressors other than the change in routine brought about by the start of the pandemic.   Father endorses that he himself was hospitalized at 17y/o for an "emotional breakdown" and was diagnosed with schizophrenia. He was in [facility] for 4 months, and after that followed up for about 1 year with psychiatry. He decided on his own to stop his meds cold turkey and "fought my way out of it."      Chart reviewed. Patient AOx2. Upon eval, patient sitting up in bed teary-eyed with red/puffy eyes, multiple ecchymoses noted in various stages of healing. States that he has been rubbing his eyes a lot. Patient very withdrawn and not making eye contact on interview with flat, scared affect. Disorganized thought process with poverty of speech/thought. Patient states that he does not know why is in the hospital, and adds that he does not want to be here. When asked about life at home, replies that it is alright. States "I don't want to talk about it" when asked about his relationship with his father. Admits to being a private person. States that he slept okay last night. Denies feeling of depression, anxiety, AH/VH, SI/HI.     Collateral obtained from patient's father this afternoon: Father states that the patient's psych history began in 2002 when he was hospitalized for depression shortly after losing his job at [employer]. He said that the patient would get very angry at little things at this time. Stated that patient thought he was wrestler [employer] and that he would make odd noises and roll around on the floor. Prior to this time, the patient did not have any formal diagnoses but as per father, "he was a frail and quiet kid who did not have any friends growing up and was bullied often by his peers."   The patient was discharged from his initial hospitalization in 2002 with sertraline 100mg, Risperidone 4mg, and Haldol. Father states that the family decided to stop giving him the Haldol right away because the patient was drooling and spacey since starting the medication. They also began cutting the Risperidone in half and were only giving him 2mg every day, starting in 2002. The patient's prescribing doctor Dr. Sanabria was not made aware of theses changes until about 1 month ago. Upon discharge in 2002, patient also began following up with psychiatry and was referred to a day program at [facility] which he attended every day until the beginning of the pandemic in March 2020.   About 1 month ago, father states that the patient began to appear more withdrawn. He was no longer getting himself dressed in the morning, eating, or going to the store/walks like he usually did. Father endorses that he would grab his son by his arm and pull him forward to try to get him to help out around the house or get himself dressed in the morning. Instead, he would sit on the couch all day and not do anything. Before this change, the patient was not very talkative at baseline but he would go to the store every morning to get his father a bagel and the paper and would help his mother around the house. He said that he still did not have much of a social life, and would often sit in the corner not engaged by anyone. They contacted the patient's psychiatrist when they noticed this change and told him that he was only talking 2mg of the Risperidone. They were advised to have him take the prescribed dose of 4mg, which he did for a short period, but when the family noticed no change they brought it back down to 2mg. Father added that he would tell the patient to wake him up at 10pm to make sure he took his medications, but the patient did not always do this. He was unsure if the patient was missing meds/how often.   The father denied any other hospitalizations other than the initial encounter in 2002 and the current admission. Denied any knowledge of a formal psychiatric diagnosis for his son. Denies his son ever telling him about AH/VH, SI/HI. Denies any substance use or identified stressors other than the change in routine brought about by the start of the pandemic.   Father endorses that he himself was hospitalized at 19y/o for an "emotional breakdown" and was diagnosed with schizophrenia. He was in [facility] for 4 months, and after that followed up for about 1 year with psychiatry. He decided on his own to stop his meds cold turkey and "fought my way out of it."

## 2023-01-30 NOTE — ED PROVIDER NOTE - NS_ATTENDINGSCRIBE_ED_ALL_ED
I personally performed the service described in the documentation recorded by the scribe in my presence, and it accurately and completely records my words and actions.
independent

## 2023-09-27 NOTE — ASU PREOP CHECKLIST - ANTIBIOTIC
n/a Preparation Of Recipient Site - Graft: The eschar was removed surgically with sharp dissection to facilitate appropriate survival of the following graft.

## 2023-10-29 NOTE — ASU DISCHARGE PLAN (ADULT/PEDIATRIC) - OK TO LEAVE MESSAGE ON VOICEMAIL
MEDICARE WELLNESS VISIT + NOTE    CHIEF COMPLAINT:  Pilar Ochoa presents for her Subsequent Annual Medicare Wellness Visit.   Her additional complaints or concerns are addressed below.      Patient Care Team:  Kanchan Bravo DO as PCP - General (Family Practice)  April Hernandez MD as Rheumatology (Rheumatology)  Ethan Lazo MD as Orthopedic Surgeon (Orthopedic Surgery)        Patient Active Problem List   Diagnosis   • Allergic rhinitis   • Benign essential hypertension   • Hyperlipidemia   • Hypothyroidism   • Transient cerebral ischemia   • IFG (impaired fasting glucose)   • Age-related osteoporosis with current pathological fracture, vertebra(e), subsequent encounter for fracture with routine healing   • Fracture of vertebra due to osteoporosis with delayed healing         Past Medical History:   Diagnosis Date   • Abnormal weight gain    • Positive occult stool blood test    • Transient cerebral ischemia          History reviewed. No pertinent surgical history.      Social History     Tobacco Use   • Smoking status: Never   • Smokeless tobacco: Never   Vaping Use   • Vaping Use: never used   Substance Use Topics   • Alcohol use: No   • Drug use: No     Family History   Problem Relation Age of Onset   • Hypertension Mother    • Patient is unaware of any medical problems Father          Current Outpatient Medications   Medication Sig Dispense Refill   • levothyroxine 50 MCG tablet Take 1 tablet by mouth daily. TAKE 1 TABLET BY MOUTH EVERY DAY 90 tablet 3   • simvastatin (ZOCOR) 20 MG tablet Take 1 tablet by mouth nightly. TAKE 1 TABLET DAILY. 90 tablet 1   • amLODIPine (NORVASC) 5 MG tablet Take 1 tablet by mouth 2 times daily. 180 tablet 1   • nebivolol (Bystolic) 5 MG tablet Take 1 tablet by mouth daily. 90 tablet 1   • aspirin (Aspirin Low Dose) 81 MG EC tablet Take 1 tablet by mouth daily. 90 tablet 3   • gabapentin (NEURONTIN) 100 MG capsule Take 1 capsule by mouth 4 times daily. 120 capsule 1    • DENOSumab (PROLIA) 60 MG/ML Solution Prefilled Syringe      • triamcinolone (KENALOG) 0.1 % dental paste Apply to affected area twice daily as needed 5 g 12     No current facility-administered medications for this visit.        The following items on the Medicare Health Risk Assessment were found to be positive  1.) Do you have an Advance directive, living will, or power of  for health care document that contains your wishes for end of life care?: No     6 b.) How many servings of High Fiber / Whole Grain Foods to you have each day ( 1 serving = 1 cup cold cereal, 1/2 cup cooked cereal, 1 slice bread): 1 per day     7a.) Have you had a fall in the past year?: Yes     7b.) Do you feel unsteady when standing or walking?: Yes     7c.) Do you worry about falling?: Yes            Vision and Hearing screens:   Vision Screening    Right eye Left eye Both eyes   Without correction   20/60   With correction      Hearing Screening - Comments:: No Gross Abnormalities (Finger Rub Method)    Advance care planning documents on file - yes    Cognitive/Functional Status: no evidence of cognitive dysfunction by direct observation    Opioid Review: Pilar is not taking opioid medications.    Recent PHQ 2/9 Score:    PHQ 2:  PHQ 2 Score Adult PHQ 2 Score Adult PHQ 2 Interpretation Little interest or pleasure in activity?   4/16/2023  10:08 AM 0 No further screening needed 0       PHQ 9:       DEPRESSION ASSESSMENT/PLAN:  Depression screening is negative no further plan needed.     Body mass index is 26.29 kg/m².    BMI ASSESSMENT/PLAN:  Patient BMI is within normal range.     See Patient Instructions section.   Return in about 6 months (around 4/29/2024) for Follow up, Fasting labs.      OUTPATIENT PROGRESS NOTE    Subjective   Chief Complaint Medicare Wellness Visit, Hyperlipidemia, and Hypertension    Here for routine follow up of chronic conditions. Feels overall healthy, has no new complaints.         Medications  Medications were reviewed and updated today.    Histories  I have personally reviewed and updated the patient's past medical, past surgical, family and social histories during today's visit.    Review of Systems   Constitutional: Negative.    HENT: Negative.    Eyes: Negative.    Respiratory: Negative.    Cardiovascular: Negative.    Gastrointestinal: Negative.    Endocrine: Negative.    Genitourinary: Negative.    Musculoskeletal: Negative.    Skin: Negative.    Neurological: Negative.    Psychiatric/Behavioral: Negative.    All other systems reviewed and are negative.      Objective   Visit Vitals  /68 (BP Location: RUE - Right upper extremity, Patient Position: Sitting)   Pulse 74   Temp 98.4 °F (36.9 °C) (Temporal)   Resp 16   Ht 4' 11\" (1.499 m)   Wt 59.1 kg (130 lb 2.9 oz)   SpO2 98%   BMI 26.29 kg/m²     Physical Exam  Vitals and nursing note reviewed.   Constitutional:       General: She is not in acute distress.     Appearance: Normal appearance. She is well-developed. She is not ill-appearing.   HENT:      Head: Normocephalic and atraumatic.      Right Ear: External ear normal.      Left Ear: External ear normal.      Nose: Nose normal.   Eyes:      Conjunctiva/sclera: Conjunctivae normal.      Pupils: Pupils are equal, round, and reactive to light.   Cardiovascular:      Rate and Rhythm: Normal rate and regular rhythm.      Heart sounds: Normal heart sounds.   Pulmonary:      Effort: Pulmonary effort is normal.      Breath sounds: Normal breath sounds.   Abdominal:      General: Bowel sounds are normal.      Palpations: Abdomen is soft.      Tenderness: There is no abdominal tenderness.   Musculoskeletal:         General: Normal range of motion.      Cervical back: Normal range of motion and neck supple.   Skin:     General: Skin is warm and dry.   Neurological:      General: No focal deficit present.      Mental Status: She is alert and oriented to person, place, and time. Mental  status is at baseline.   Psychiatric:         Mood and Affect: Mood normal.         Behavior: Behavior normal.         Thought Content: Thought content normal.         Judgment: Judgment normal.         Laboratory  I have reviewed the pertinent laboratory tests. These are the pertinent findings:  Labs ordered    Imaging  I have reviewed the pertinent imaging study reports. These are the pertinent findings:  None needed    Assessment & Plan   Diagnoses and associated orders for this visit:  1. Medicare annual wellness visit, subsequent  2. Benign essential hypertension  Assessment & Plan:  At goal.  Counseled of low sodium diet, exercise, adherence with treatment plan.  Complications of uncontrolled HTN explained (i.e CVA, MI, ESRD, CHF, etc) explained.     Orders:  -     amLODIPine Besylate  -     Nebivolol HCl  -     Urinalysis & Reflex Microscopy  -     Thyroid Stimulating Hormone Reflex  -     Glycohemoglobin  -     Lipid Panel With Reflex  -     Comprehensive Metabolic Panel  -     CBC with Automated Differential  -     CBC with Automated Differential  -     Comprehensive Metabolic Panel  -     Lipid Panel With Reflex  -     Glycohemoglobin  -     Thyroid Stimulating Hormone Reflex  -     Urinalysis & Reflex Microscopy  3. Mixed hyperlipidemia  Assessment & Plan:  No myalgia reported on statin.  Reinforced low fat/chol diet; cardio exercises, compliance with treatment plan.  Follow lipid, LFT's.   Orders:  -     Simvastatin  -     Urinalysis & Reflex Microscopy  -     Thyroid Stimulating Hormone Reflex  -     Glycohemoglobin  -     Lipid Panel With Reflex  -     Comprehensive Metabolic Panel  -     CBC with Automated Differential  -     CBC with Automated Differential  -     Comprehensive Metabolic Panel  -     Lipid Panel With Reflex  -     Glycohemoglobin  -     Thyroid Stimulating Hormone Reflex  -     Urinalysis & Reflex Microscopy  4. Hypothyroidism, unspecified type  Assessment & Plan:  Urge compliance  with med, lab testings.  No heat/cold intolerance, weight changes   Orders:  -     Levothyroxine Sodium  -     Urinalysis & Reflex Microscopy  -     Thyroid Stimulating Hormone Reflex  -     Glycohemoglobin  -     Lipid Panel With Reflex  -     Comprehensive Metabolic Panel  -     CBC with Automated Differential  -     CBC with Automated Differential  -     Comprehensive Metabolic Panel  -     Lipid Panel With Reflex  -     Glycohemoglobin  -     Thyroid Stimulating Hormone Reflex  -     Urinalysis & Reflex Microscopy  5. IFG (impaired fasting glucose)  Assessment & Plan:  Reinforced low sweets/carb diet, exercise, avoid weight gain to delay progression to type 2 DM. Check A1C   Orders:  -     Urinalysis & Reflex Microscopy  -     Thyroid Stimulating Hormone Reflex  -     Glycohemoglobin  -     Lipid Panel With Reflex  -     Comprehensive Metabolic Panel  -     CBC with Automated Differential  -     CBC with Automated Differential  -     Comprehensive Metabolic Panel  -     Lipid Panel With Reflex  -     Glycohemoglobin  -     Thyroid Stimulating Hormone Reflex  -     Urinalysis & Reflex Microscopy  6. Immunization due  -     INFLUENZA QUADRIVALENT HIGH DOSE PRES FREE 0.7 ML VACC,IM  -     COVID MODERNA/SPIKEVAX 12+([id number])  7. Fracture of vertebra due to osteoporosis with delayed healing, subsequent encounter  -     Gabapentin     Yes

## 2024-06-23 NOTE — ASU PATIENT PROFILE, ADULT - SPIRITUAL CULTURAL, CURRENT SITUATION, PROFILE
Pharmacist Admission Medication History    Admission medication history is complete. The information provided in this note is only as accurate as the sources available at the time of the update.    Information Source(s): Patient and CareEverywhere/SureScripts via in-person    Pertinent Information: Surescripts not available at time of interview. [email or web address] and confirmed with patient.  [facility] show amlodipine was recently stopped and chlorthalidone was recently increased     Changes made to PTA medication list:  Added: all meds added  Deleted: None  Changed: None    Allergies reviewed with patient and updates made in EHR: yes    Medication History Completed By: Madelin Quintero [facility] 6/23/2024 11:22 AM    PTA Med List   Medication Sig Last Dose    acetaminophen (TYLENOL) 500 MG tablet Take 500 mg by mouth every 6 hours as needed for mild pain Unknown    apixaban ANTICOAGULANT (ELIQUIS) 2.5 MG tablet Take 2.5 mg by mouth 2 times daily 6/22/2024    benazepril (LOTENSIN) 40 MG tablet Take 40 mg by mouth daily 6/22/2024    calcium carbonate (OS-JHONATHAN) 500 MG TABS Take 1 tablet by mouth daily 6/22/2024    chlorthalidone (HYGROTON) 25 MG tablet Take 1 tablet by mouth daily 6/22/2024    cholecalciferol ( ULTRA STRENGTH) 50 MCG (2000 UT) CAPS Take 1 capsule by mouth daily 6/22/2024    metFORMIN (GLUCOPHAGE XR) 500 MG 24 hr tablet Take 500 mg by mouth 2 times daily (with meals) 6/22/2024    methylcellulose (CITRUCEL) 500 MG TABS tablet Take 500 mg by mouth daily 6/22/2024    simvastatin (ZOCOR) 10 MG tablet Take 1 tablet by mouth every evening 6/22/2024       
none
